# Patient Record
Sex: FEMALE | NOT HISPANIC OR LATINO | Employment: FULL TIME | URBAN - NONMETROPOLITAN AREA
[De-identification: names, ages, dates, MRNs, and addresses within clinical notes are randomized per-mention and may not be internally consistent; named-entity substitution may affect disease eponyms.]

---

## 2018-01-24 ENCOUNTER — OFFICE VISIT CONVERTED (OUTPATIENT)
Dept: FAMILY MEDICINE CLINIC | Age: 33
End: 2018-01-24
Attending: NURSE PRACTITIONER

## 2019-01-10 ENCOUNTER — HOSPITAL ENCOUNTER (OUTPATIENT)
Dept: OTHER | Facility: HOSPITAL | Age: 34
Discharge: HOME | End: 2019-01-10
Attending: NURSE PRACTITIONER

## 2019-01-10 LAB
CHOLEST SERPL-MCNC: 171 MG/DL (ref 107–200)
CHOLEST/HDLC SERPL: 2.9 {RATIO} (ref 3–6)
GLUCOSE SERPL-MCNC: 76 MG/DL (ref 65–115)
HDLC SERPL-MCNC: 58 MG/DL (ref 40–60)
LDLC SERPL CALC-MCNC: 103 MG/DL (ref 70–100)
TRIGL SERPL-MCNC: 48 MG/DL (ref 40–150)
VLDLC SERPL-MCNC: 10 MG/DL (ref 5–37)

## 2019-01-30 ENCOUNTER — OFFICE VISIT CONVERTED (OUTPATIENT)
Dept: FAMILY MEDICINE CLINIC | Age: 34
End: 2019-01-30
Attending: NURSE PRACTITIONER

## 2020-01-21 ENCOUNTER — HOSPITAL ENCOUNTER (OUTPATIENT)
Dept: OTHER | Facility: HOSPITAL | Age: 35
Discharge: HOME | End: 2020-01-21
Attending: NURSE PRACTITIONER

## 2020-01-21 LAB
CHOLEST SERPL-MCNC: 175 MG/DL (ref 107–200)
CHOLEST/HDLC SERPL: 3.1 {RATIO} (ref 3–6)
GLUCOSE SERPL-MCNC: 57 MG/DL (ref 65–115)
HDLC SERPL-MCNC: 56 MG/DL (ref 40–60)
LDLC SERPL CALC-MCNC: 106 MG/DL (ref 70–100)
TRIGL SERPL-MCNC: 64 MG/DL (ref 40–150)
VLDLC SERPL-MCNC: 13 MG/DL (ref 5–37)

## 2020-02-07 ENCOUNTER — OFFICE VISIT CONVERTED (OUTPATIENT)
Dept: FAMILY MEDICINE CLINIC | Age: 35
End: 2020-02-07
Attending: NURSE PRACTITIONER

## 2021-05-18 NOTE — PROGRESS NOTES
Lori Fuentes 1985     Office/Outpatient Visit    Visit Date: Wed, Jan 24, 2018 11:35 am    Provider: Alecia Burrell N.P. (Assistant: Kristen Tran MA)    Location: [facility]        Electronically signed by Alecia Burrell N.P. on  01/24/2018 10:23:16 PM                             SUBJECTIVE:        CC:     Ms. Fuentes is a 32 year old White female.  [facility];         HPI:         Ms. Fuentes presents with annual physical.  Her last physical exam was 1 year ago.  Her last menstrual period was last week.  She is not currently using any form of contraception.  She performs breast self-exams occasionally.    Her last Pap smear was in may 2017 and was normal.   She has never had a mammogram. She's had vision screening done 2 years ago and this was normal.   A hearing test was done 2 years ago and results were normal.   Preventative Health updated today.  She is current with her Td and influenza immunization.  Ms. Fuentes denies any history of abnormal Pap smears.      ROS:     CONSTITUTIONAL:  Positive for intentional wt loss.  saw wt loss solutions 2017..   Negative for fatigue or fever.      EYES:  Negative for blurred vision, eye pain, and photophobia.      E/N/T:  Positive for nasal congestion.   Negative for frequent rhinorrhea.      CARDIOVASCULAR:  Negative for chest pain and pedal edema.      RESPIRATORY:  Negative for cough, dyspnea, and hemoptysis.      GASTROINTESTINAL:  Negative for abdominal pain, acid reflux symptoms, constipation, diarrhea, heartburn, nausea and vomiting.      GENITOURINARY:  Negative for dysuria and vaginal discharge.      MUSCULOSKELETAL:  Negative for arthralgias, back pain, and myalgias.      INTEGUMENTARY/BREAST:  Negative for rash.      NEUROLOGICAL:  Positive for paresthesia ( carpal tunnel right hand.  wears brace at night.  symptoms stable. ).   Negative for dizziness or headaches.      ENDOCRINE:  Negative for hair loss, heat/cold intolerance, polydipsia,  and polyphagia.      ALLERGIC/IMMUNOLOGIC:  Positive for seasonal allergies.      PSYCHIATRIC:  Negative for anxiety, depression and sleep disturbance.          PMH/FMH/SH:     Last Reviewed on 2018 12:10 PM by Alecia Burrell    Past Medical History:                 PAST MEDICAL HISTORY         mono as a teenager.          GYNECOLOGICAL HISTORY:        Not currently using any form of contraception.          Surgical History:         Breast Augmentation - below the muscle; uncomplicated; 03.          Family History:         Positive for Hyperlipidemia ( father ) and Hypertension ( father ).      Positive for Type 2 Diabetes ( father ).          Social History:     Occupation: a .   [employer]     Marital Status:      Children: 3 children     Exercise: Primary form of exercise is walking.   Frequency is 3 days per week.          Tobacco/Alcohol/Supplements:     Last Reviewed on 2018 11:38 AM by Kristen Tran    Tobacco: She has never smoked.          Alcohol: When she drinks, the average quantity of alcohol is frequency monthly.      Caffeine:  She admits to consuming caffeine via -crystal light energy drink daily.          Substance Abuse History:     NEGATIVE             Current Problems:     Last Reviewed on 3/17/2016 09:13 AM by Alecia Burrell    Family history of diabetes type II         Immunizations:     Fluzone (3 + years dose) 10/30/2017         Allergies:     Last Reviewed on 3/16/2016 02:19 PM by Diandra Cosme      No Known Drug Allergies.         Current Medications:     Last Reviewed on 2018 11:38 AM by Kristen Tran    None        OBJECTIVE:        Vitals:         Historical:     2016  BP:   114/73 mm Hg ( (right arm, , sitting, );)     2016  Wt:   289.4lbs        Current: 2018 11:37:25 AM    Ht:  5 ft, 5 in;  Wt: 212.1 lbs;  BMI: 35.3    T: 97.1 F (oral);  BP: 134/81 mm Hg (right arm, standing);  P: 63 bpm (right arm (BP Cuff), standing);   sCr: 0.76 mg/dL;  GFR: 120.50        Exams:     PHYSICAL EXAM:     GENERAL:  well developed and nourished; appropriately groomed; in no apparent distress;     EYES: PERRL, EOMI     E/N/T: EARS: bilateral TMs are normal;  NOSE: normal nasal mucosa; OROPHARYNX: posterior pharynx, including tonsils, tongue, and uvula are normal;     NECK: thyroid is non-palpable;     RESPIRATORY: normal respiratory rate and pattern with no distress; normal breath sounds with no rales, rhonchi, wheezes or rubs;     CARDIOVASCULAR: normal rate; rhythm is regular;  no edema;     GASTROINTESTINAL: nontender; normal bowel sounds;     MUSCULOSKELETAL:  Normal range of motion, strength and tone;     NEUROLOGIC: mental status: alert and oriented x 3; GROSSLY INTACT     PSYCHIATRIC:  appropriate affect and demeanor; normal speech pattern; grossly normal memory;         ASSESSMENT           V70.0   Z00.00  Annual physical              DDx:         PLAN:          Annual physical         COUNSELING was provided today regarding the following topics: healthy eating habits, low cholesterol diet, low salt diet, regular exercise, breast self-exam, contraception, and STD prevention.            Patient Education Handouts:       Physical Exam 30-39 year, Female              Patient Recommendations:        For  Annual physical:         Limit dietary intake of fat (especially saturated fat) and cholesterol.  Eat a variety of foods, including plenty of fruits, vegetables, and grain containg fiber, limit fat intake to 30% of total calories. Balance caloric intake with energy expended.    Maintaining regular physical activity is advised to help prevent heart disease, hypertension, diabetes, and obesity.    You should regularly examine your breasts, easily done while in the shower or with lotion.  Feel and look for differences in consistency from month to month, especially noting knots or lumps, changes in skin appearance, nipple retraction or discharge.     Sexually transmitted diseases may be prevented by abstaining from sexual activity or avoiding sexual contact with high risk partners, and consistently using a condom or female barrier contraceptives plus spermacide.              CHARGE CAPTURE           **Please note: ICD descriptions below are intended for billing purposes only and may not represent clinical diagnoses**        Primary Diagnosis:         V70.0 Annual physical            Z00.00    Encounter for general adult medical examination without abnormal findings              Orders:          [id number]   Preventive medicine, established patient, age 18-39 years  (In-House)

## 2021-05-18 NOTE — PROGRESS NOTES
Lori Fuentes 1985     Office/Outpatient Visit    Visit Date: Wed, Jan 30, 2019 11:38 am    Provider: Alecia Burrell N.P. (Assistant: Sarah Spurling, MA)    Location: [facility]        Electronically signed by Alecia Burrell N.P. on  01/30/2019 04:48:05 PM                             SUBJECTIVE:        CC:     Ms. Fuentes is a 33 year old White female.  Prevenative Exam;         HPI:         Health checkup noted.  Her last physical exam was 1 year ago.  Her last menstrual period was Jan 16, 2019.  She is not currently using any form of contraception.  She does not perform breast self-exams.    Her last Pap smear was in May 2018.   She has never had a mammogram. She has never had a dexa scan. Preventative Health updated today.  She is current with her Td and influenza immunization.  Ms. Fuentes denies any history of abnormal Pap smears.  Tobacco: She has never smoked.          PHQ-9 Depression Screening: Completed form scanned and in chart; Total Score 0 Alcohol Consumption Screening: Completed form scanned and in chart; Total Score 1     ROS:     CONSTITUTIONAL:  Negative for chills, fatigue, fever, and weight change.      EYES:  Negative for blurred vision, eye pain, and photophobia.      E/N/T:  Negative for hearing problems, E/N/T pain, congestion, rhinorrhea, epistaxis, hoarseness, and dental problems.      CARDIOVASCULAR:  Negative for chest pain, palpitations, tachycardia, orthopnea, and edema.      RESPIRATORY:  Negative for cough, dyspnea, and hemoptysis.      GASTROINTESTINAL:  Negative for abdominal pain, heartburn, constipation, diarrhea, and stool changes.      GENITOURINARY:  Negative for genital lesions, hematuria, menstrual problems, polyuria, abnormal vaginal bleeding, and vaginal discharge.      MUSCULOSKELETAL:  Negative for arthralgias, back pain, and myalgias.      NEUROLOGICAL:  Positive for paresthesia ( intermittent carpal tunnel with menstrual cycle- stable ).       ALLERGIC/IMMUNOLOGIC:  Positive for seasonal allergies.      PSYCHIATRIC:  Negative for anxiety, depression, and sleep disturbances.          PMH/FMH/SH:     Last Reviewed on 2019 12:01 PM by Alecia Burrell    Past Medical History:                 PAST MEDICAL HISTORY         mono as a teenager.          GYNECOLOGICAL HISTORY:        Not currently using any form of contraception.          CURRENT MEDICAL PROVIDERS:    Obstetrician/Gynecologist         PREVENTIVE HEALTH MAINTENANCE             PAP SMEAR: was last done may 2018 with normal results         Surgical History:         Breast Augmentation - below the muscle; uncomplicated; 03.          Family History:         Positive for Hyperlipidemia ( father ) and Hypertension ( father ).      Positive for Breast Cancer ( mat. aunt; maternal first cousin ).      Positive for Type 2 Diabetes ( father ).          Social History:     Occupation: a .   [employer]     Marital Status:      Children: 3 children     Exercise: Primary form of exercise is walking.   Frequency is 3 days per week.          Tobacco/Alcohol/Supplements:     Last Reviewed on 2019 11:39 AM by Spurling, Sarah C    Tobacco: She has never smoked.          Alcohol: When she drinks, the average quantity of alcohol is frequency monthly.      Caffeine:  She admits to consuming caffeine via -crystal light energy drink daily.          Substance Abuse History:     NEGATIVE             Current Problems:     Last Reviewed on 3/17/2016 09:13 AM by Alecia Burrell    Family history of diabetes type II         Immunizations:     Fluzone (3 + years dose) 10/30/2017         Allergies:     Last Reviewed on 2018 11:37 AM by Kristen Tran      No Known Drug Allergies.         Current Medications:     Last Reviewed on 2019 11:44 AM by Spurling, Sarah C    Terbinafine HCl 250mg Tablet Take 1 tablet(s) by mouth daily         OBJECTIVE:        Vitals:         Historical:      01/24/2018  BP:   134/81 mm Hg ( (right arm, , standing, );)     01/24/2018  Wt:   212.1lbs        Current: 1/30/2019 11:47:19 AM    Ht:  5 ft, 5 in;  Wt: 209.4 lbs;  BMI: 34.8    T: 98.5 F (oral);  BP: 104/71 mm Hg (right arm, standing);  P: 71 bpm (right arm (BP Cuff), standing);  sCr: 0.76 mg/dL;  GFR: 118.75        Exams:     PHYSICAL EXAM:     GENERAL:  well developed and nourished; appropriately groomed; in no apparent distress;     EYES: PERRL, EOMI     E/N/T: EARS: bilateral TMs are normal;  NOSE: normal nasal mucosa; OROPHARYNX: posterior pharynx, including tonsils, tongue, and uvula are normal;     NECK: thyroid is non-palpable;     RESPIRATORY: normal respiratory rate and pattern with no distress; normal breath sounds with no rales, rhonchi, wheezes or rubs;     CARDIOVASCULAR: normal rate; rhythm is regular;  no edema;     GASTROINTESTINAL: nontender; normal bowel sounds; no organomegaly;     LYMPHATIC: no enlargement of cervical or facial nodes;     MUSCULOSKELETAL:  Normal range of motion, strength and tone;     NEUROLOGIC: mental status: alert and oriented x 3; GROSSLY INTACT     PSYCHIATRIC:  appropriate affect and demeanor; normal speech pattern; grossly normal memory;         ASSESSMENT           V70.0   Z00.00  Health checkup              DDx:     V79.0   Z13.89  Screening for depression              DDx:         ORDERS:         Other Orders:         Depression screen negative  (In-House)                   PLAN:          Health checkup         COUNSELING was provided today regarding the following topics: healthy eating habits, regular exercise, breast self-exam, contraception, STD prevention, and maternal aunt and first cousin with breast cancer.  advise she check member benefits with insurance to determine when baseline mammogram could be ordered and continue self breast exams..            Patient Education Handouts:       Physical Exam 30-39 year, Female           Screening for depression      MIPS PHQ-9 Depression Screening Completed form scanned and in chart; Total Score 0           Orders:         Depression screen negative  (In-House)               Patient Recommendations:        For  Health checkup:         Limit dietary intake of fat (especially saturated fat) and cholesterol.  Eat a variety of foods, including plenty of fruits, vegetables, and grain containg fiber, limit fat intake to 30% of total calories. Balance caloric intake with energy expended.    Maintaining regular physical activity is advised to help prevent heart disease, hypertension, diabetes, and obesity.    You should regularly examine your breasts, easily done while in the shower or with lotion.  Feel and look for differences in consistency from month to month, especially noting knots or lumps, changes in skin appearance, nipple retraction or discharge.    Sexually transmitted diseases may be prevented by abstaining from sexual activity or avoiding sexual contact with high risk partners, and consistently using a condom or female barrier contraceptives plus spermacide.              CHARGE CAPTURE           **Please note: ICD descriptions below are intended for billing purposes only and may not represent clinical diagnoses**        Primary Diagnosis:         V70.0 Health checkup            Z00.00    Encounter for general adult medical examination without abnormal findings              Orders:          [id number]   Preventive medicine, established patient, age 18-39 years  (In-House)           V79.0 Screening for depression            Z13.89    Encounter for screening for other disorder              Orders:             Depression screen negative  (In-House)

## 2021-05-18 NOTE — PROGRESS NOTES
Lori Fuentes  1985     Office/Outpatient Visit    Visit Date: Fri, Feb 7, 2020 11:34 am    Provider: Alecia Burrell N.P. (Assistant: Katy Purcell MA)    Location: [facility]        Electronically signed by Alecia Burrell N.P. on  02/09/2020 08:43:57 PM                             Subjective:        CC: Ms. Fuentes is a 34 year old White female.  well check;         HPI:           Patient complains of encounter for general adult medical examination without abnormal findings.  Her last physical exam was 1 year ago.  Her last menstrual period was 2 wks ago.  She is not currently using any form of contraception.  She performs breast self-exams occasionally.   Her last Pap smear was in july 2019 and was normal.   She has never had a mammogram. She has never had a dexa scan. She has never had a flexible sigmoidoscopy or colonoscopy. She's had vision screening done 2 years ago and this was normal.   A hearing test was done 4 years ago and results were normal.   Preventative Health updated today.  She is current with her Td, influenza and td ap 2016 immunization.            PHQ-9 Depression Screening: Completed form scanned and in chart; Total Score 0     ROS:     CONSTITUTIONAL:  Positive for unintentional weight gain ( gradual; did well on phentermine 4 yrs ago-  lost 70 lbs.  would like to take phentermine again. ).   Negative for fatigue, fever or night sweats.      EYES:  Negative for blurred vision, eye pain, and photophobia.      E/N/T:  Negative for hearing problems, E/N/T pain, congestion, rhinorrhea, epistaxis, hoarseness, and dental problems.      CARDIOVASCULAR:  Negative for chest pain and pedal edema.      RESPIRATORY:  Negative for recent cough, dyspnea and frequent wheezing.      GASTROINTESTINAL:  Negative for constipation, diarrhea, nausea and vomiting.      GENITOURINARY:  Negative for dysuria.      MUSCULOSKELETAL:  Negative for arthralgias, back pain, and myalgias.       INTEGUMENTARY/BREAST:  Negative for rash.      NEUROLOGICAL:  Negative for dizziness and headaches.      ALLERGIC/IMMUNOLOGIC:  Positive for seasonal allergies.      PSYCHIATRIC:  Negative for anxiety, depression, sleep disturbance and suicidal thoughts.          Past Medical History / Family History / Social History:         Last Reviewed on 2020 11:45 AM by Alecia Burrell    Past Medical History:                 PAST MEDICAL HISTORY         mono as a teenager.          GYNECOLOGICAL HISTORY:        Not currently using any form of contraception.          CURRENT MEDICAL PROVIDERS:    Obstetrician/Gynecologist         PREVENTIVE HEALTH MAINTENANCE             PAP SMEAR: was last done 2019 with normal results         Surgical History:         Breast Augmentation - below the muscle; uncomplicated; 03.          Family History:         Positive for Hyperlipidemia ( father ) and Hypertension ( father ).      Positive for Breast Cancer ( mat. aunt; maternal first cousin ).      Positive for Type 2 Diabetes ( father ).          Social History:     Occupation: a .   [employer]     Marital Status:      Children: 3 children     Exercise: Primary form of exercise is walking.   Frequency is 3 days per week.          Tobacco/Alcohol/Supplements:     Last Reviewed on 2020 11:39 AM by Katy Purcell    Tobacco: She has never smoked.          Alcohol: When she drinks, the average quantity of alcohol is frequency monthly.      Caffeine:  She admits to consuming caffeine via -crystal light energy drink daily.          Substance Abuse History:     NEGATIVE         Current Problems:     Last Reviewed on 2020 11:45 AM by Alecia Burrell    Family history of diabetes type II    Encounter for general adult medical examination without abnormal findings    Encounter for screening for depression        Immunizations:     Fluzone (3 + years dose) 10/30/2017        Allergies:     Last Reviewed  on 1/30/2019 11:39 AM by Spurling, Sarah C    No Known Allergies.        Current Medications:     Last Reviewed on 1/30/2019 11:44 AM by Spurling, Sarah C    vitamin C         Objective:        Vitals:         Current: 2/7/2020 11:43:23 AM    Ht:  5 ft, 5 in;  Wt: 224 lbs;  WC: 37.5 inches;  BMI: 37.3T: 98.4 F (oral);  BP: 106/66 mm Hg (right arm, standing);  P: 78 bpm (right arm (BP Cuff), standing);  sCr: 0.76 mg/dL;  GFR: 121.06        Exams:     PHYSICAL EXAM:     GENERAL: obese;  no apparent distress;     EYES: PERRL, EOMI     E/N/T: EARS: bilateral TMs are normal;  NOSE: normal nasal mucosa; OROPHARYNX: posterior pharynx, including tonsils, tongue, and uvula are normal;     NECK: thyroid is non-palpable;     RESPIRATORY: normal respiratory rate and pattern with no distress; normal breath sounds with no rales, rhonchi, wheezes or rubs;     CARDIOVASCULAR: normal rate; rhythm is regular;     GASTROINTESTINAL: nontender; normal bowel sounds; no organomegaly;     MUSCULOSKELETAL:  Normal range of motion, strength and tone;     NEUROLOGIC: mental status: alert and oriented x 3; GROSSLY INTACT     PSYCHIATRIC:  appropriate affect and demeanor; normal speech pattern; grossly normal memory;         Assessment:         Z00.00   Encounter for general adult medical examination without abnormal findings       Z13.31   Encounter for screening for depression           ORDERS:         Other Orders:         Depression screen negative  (In-House)                      Plan:         Encounter for general adult medical examination without abnormal findings        COUNSELING was provided today regarding the following topics: healthy eating habits, weight loss program, low cholesterol diet, low salt diet, regular exercise, breast self-exam, contraception, STD prevention, and biometric form completed.  copies of recent labs provided and discussed.  I do not recommend phentermine due to cardiac risks.  in general medications for  "wt loss include either cardiac risks or GI upset.  decreased calric intake and increased physical activity are recommended.  she declines referral to dietitian.  states \"I know how to eat.  I need to do something about hunger.\"  advise that insurance would require referral to dietitian prior to covering medicaiton such as contrave or saxenda.  could try dayami over the counter but she states she did not like dayami and it did not solve the problem.  provide number to weight loss provider in [place].  office on [address] closed..            Patient Education Handouts:       Physical Exam 30-39 year, Female          Encounter for screening for depression    MIPS PHQ-9 Depression Screening: Completed form scanned and in chart; Total Score 0; Negative Depression Screen           Orders:         Depression screen negative  (In-House)                  Patient Recommendations:        For  Encounter for general adult medical examination without abnormal findings:        Limit dietary intake of fat (especially saturated fat) and cholesterol.  Eat a variety of foods, including plenty of fruits, vegetables, and grain containg fiber, limit fat intake to 30% of total calories. Balance caloric intake with energy expended.    Maintaining regular physical activity is advised to help prevent heart disease, hypertension, diabetes, and obesity.    You should regularly examine your breasts, easily done while in the shower or with lotion.  Feel and look for differences in consistency from month to month, especially noting knots or lumps, changes in skin appearance, nipple retraction or discharge.    Sexually transmitted diseases may be prevented by abstaining from sexual activity or avoiding sexual contact with high risk partners, and consistently using a condom or female barrier contraceptives plus spermacide.              Charge Capture:         Primary Diagnosis:     Z00.00  Encounter for general adult medical examination without " abnormal findings           Orders:      [id number]  Preventive medicine, established patient, age 18-39 years  (In-House)              Z13.31  Encounter for screening for depression           Orders:        Depression screen negative  (In-House)

## 2021-07-01 VITALS
BODY MASS INDEX: 35.34 KG/M2 | WEIGHT: 212.1 LBS | TEMPERATURE: 97.1 F | HEART RATE: 63 BPM | SYSTOLIC BLOOD PRESSURE: 134 MMHG | DIASTOLIC BLOOD PRESSURE: 81 MMHG | HEIGHT: 65 IN

## 2021-07-01 VITALS
SYSTOLIC BLOOD PRESSURE: 104 MMHG | HEART RATE: 71 BPM | DIASTOLIC BLOOD PRESSURE: 71 MMHG | HEIGHT: 65 IN | TEMPERATURE: 98.5 F | WEIGHT: 209.4 LBS | BODY MASS INDEX: 34.89 KG/M2

## 2021-07-02 VITALS
DIASTOLIC BLOOD PRESSURE: 66 MMHG | HEIGHT: 65 IN | WEIGHT: 224 LBS | HEART RATE: 78 BPM | BODY MASS INDEX: 37.32 KG/M2 | SYSTOLIC BLOOD PRESSURE: 106 MMHG | TEMPERATURE: 98.4 F